# Patient Record
Sex: MALE | Race: WHITE | NOT HISPANIC OR LATINO | Employment: OTHER | URBAN - METROPOLITAN AREA
[De-identification: names, ages, dates, MRNs, and addresses within clinical notes are randomized per-mention and may not be internally consistent; named-entity substitution may affect disease eponyms.]

---

## 2024-11-11 ENCOUNTER — ANESTHESIA EVENT (OUTPATIENT)
Facility: HOSPITAL | Age: 69
End: 2024-11-11
Payer: MEDICARE

## 2024-11-11 NOTE — ANESTHESIA PREPROCEDURE EVALUATION
"                                                                                                             11/11/2024  Dennis Vega is a 69 y.o., male.  Pre-operative evaluation for Procedure(s) (LRB):  PHACOEMULSIFICATION, CATARACT, WITH IOL INSERTION   ////left eye; Diamox (Left)    Dennis Vega is a 69 y.o. male     There is no problem list on file for this patient.      Review of patient's allergies indicates:   Allergen Reactions    Penicillins      Unknown       No current facility-administered medications on file prior to encounter.     No current outpatient medications on file prior to encounter.       History reviewed. No pertinent surgical history.    CBC: No results for input(s): "WBC", "RBC", "HGB", "HCT", "PLT", "MCV", "MCH", "MCHC" in the last 72 hours.    CMP: No results for input(s): "NA", "K", "CL", "CO2", "BUN", "CREATININE", "GLU", "MG", "PHOS", "CALCIUM", "ALBUMIN", "PROT", "ALKPHOS", "ALT", "AST", "BILITOT" in the last 72 hours.    INR  No results for input(s): "PT", "INR", "PROTIME", "APTT" in the last 72 hours.        Diagnostic Studies:  CXR :    EKG :      2D Echo :  No results found for this or any previous visit.    Pre-op Assessment    I have reviewed the Patient Summary Reports.     I have reviewed the Nursing Notes. I have reviewed the NPO Status.   I have reviewed the Medications.     Review of Systems  Anesthesia Hx:  No previous Anesthesia   Neg history of prior surgery.          Denies Family Hx of Anesthesia complications.     Social:  Non-Smoker, No Alcohol Use       Hematology/Oncology:  Hematology Normal                                     EENT/Dental:  EENT/Dental Normal           Cardiovascular:  Cardiovascular Normal    Denies Pacemaker.     Denies CABG/stent.             Patient not on beta blockers                          Pulmonary:  Pulmonary Normal                       Renal/:  Renal/ Normal                 Hepatic/GI:  Hepatic/GI Normal     Denies GERD.    Not " Taking GLP-1 Agonists            Musculoskeletal:  Musculoskeletal Normal                Neurological:  Neurology Normal                                      Endocrine:  Endocrine Normal            Dermatological:  Skin Normal    Psych:  Psychiatric Normal                    Physical Exam  General: Well nourished, Cooperative, Alert and Oriented    Airway:  Mallampati: I / I  Mouth Opening: Normal  TM Distance: Normal  Tongue: Normal  Neck ROM: Normal ROM    Dental:  Intact  Px denies any loose teeth. Crowns few molars  Chest/Lungs:  Clear to auscultation, Normal Respiratory Rate    Heart:  Rate: Normal  Rhythm: Regular Rhythm    Abdomen:  Normal, Soft        Anesthesia Plan  Type of Anesthesia, risks & benefits discussed:    Anesthesia Type: MAC  Intra-op Monitoring Plan: Standard ASA Monitors  Induction:  IV  Informed Consent: Informed consent signed with the Patient and all parties understand the risks and agree with anesthesia plan.  All questions answered.   ASA Score: 2  Day of Surgery Review of History & Physical: H&P Update referred to the surgeon/provider.I have interviewed and examined the patient. I have reviewed the patient's H&P dated:   Anesthesia Plan Notes: TIVA with mask/NC, GA back-up.     Ready For Surgery From Anesthesia Perspective.     .

## 2024-11-12 ENCOUNTER — HOSPITAL ENCOUNTER (OUTPATIENT)
Facility: HOSPITAL | Age: 69
Discharge: HOME | End: 2024-11-12
Attending: OPHTHALMOLOGY | Admitting: OPHTHALMOLOGY
Payer: MEDICARE

## 2024-11-12 ENCOUNTER — ANESTHESIA (OUTPATIENT)
Facility: HOSPITAL | Age: 69
End: 2024-11-12
Payer: MEDICARE

## 2024-11-12 VITALS
BODY MASS INDEX: 24.75 KG/M2 | HEIGHT: 64 IN | SYSTOLIC BLOOD PRESSURE: 126 MMHG | HEART RATE: 62 BPM | WEIGHT: 145 LBS | TEMPERATURE: 98 F | DIASTOLIC BLOOD PRESSURE: 72 MMHG | OXYGEN SATURATION: 95 %

## 2024-11-12 DIAGNOSIS — H26.9 CATARACT: ICD-10-CM

## 2024-11-12 PROCEDURE — 37000009 HC ANESTHESIA EA ADD 15 MINS: Performed by: OPHTHALMOLOGY

## 2024-11-12 PROCEDURE — 37000008 HC ANESTHESIA 1ST 15 MINUTES: Performed by: OPHTHALMOLOGY

## 2024-11-12 PROCEDURE — 63600175 PHARM REV CODE 636 W HCPCS: Performed by: OPHTHALMOLOGY

## 2024-11-12 PROCEDURE — 63600175 PHARM REV CODE 636 W HCPCS

## 2024-11-12 PROCEDURE — 25000003 PHARM REV CODE 250

## 2024-11-12 PROCEDURE — 36000707: Performed by: OPHTHALMOLOGY

## 2024-11-12 PROCEDURE — 71000015 HC POSTOP RECOV 1ST HR: Performed by: OPHTHALMOLOGY

## 2024-11-12 PROCEDURE — 36000706: Performed by: OPHTHALMOLOGY

## 2024-11-12 PROCEDURE — V2632 POST CHMBR INTRAOCULAR LENS: HCPCS | Performed by: OPHTHALMOLOGY

## 2024-11-12 PROCEDURE — 27201423 OPTIME MED/SURG SUP & DEVICES STERILE SUPPLY: Performed by: OPHTHALMOLOGY

## 2024-11-12 PROCEDURE — 25000003 PHARM REV CODE 250: Performed by: OPHTHALMOLOGY

## 2024-11-12 DEVICE — TECNIS SIMPLICITY TECNIS EYHANCE U 23.5D
Type: IMPLANTABLE DEVICE | Site: EYE | Status: FUNCTIONAL
Brand: TECNIS SIMPLICITY

## 2024-11-12 RX ORDER — ACETAMINOPHEN 500 MG
1000 TABLET ORAL ONCE
Status: COMPLETED | OUTPATIENT
Start: 2024-11-12 | End: 2024-11-12

## 2024-11-12 RX ORDER — DEXAMETHASONE SODIUM PHOSPHATE 10 MG/ML
INJECTION INTRAMUSCULAR; INTRAVENOUS
Status: DISCONTINUED | OUTPATIENT
Start: 2024-11-12 | End: 2024-11-12 | Stop reason: HOSPADM

## 2024-11-12 RX ORDER — ONDANSETRON HYDROCHLORIDE 2 MG/ML
INJECTION, SOLUTION INTRAVENOUS
Status: DISCONTINUED | OUTPATIENT
Start: 2024-11-12 | End: 2024-11-12

## 2024-11-12 RX ORDER — POVIDONE-IODINE 5 %
SOLUTION, NON-ORAL OPHTHALMIC (EYE)
Status: DISCONTINUED | OUTPATIENT
Start: 2024-11-12 | End: 2024-11-12 | Stop reason: HOSPADM

## 2024-11-12 RX ORDER — MIDAZOLAM HYDROCHLORIDE 1 MG/ML
INJECTION INTRAMUSCULAR; INTRAVENOUS
Status: DISCONTINUED | OUTPATIENT
Start: 2024-11-12 | End: 2024-11-12

## 2024-11-12 RX ORDER — GENTAMICIN 40 MG/ML
INJECTION, SOLUTION INTRAMUSCULAR; INTRAVENOUS
Status: DISCONTINUED | OUTPATIENT
Start: 2024-11-12 | End: 2024-11-12 | Stop reason: HOSPADM

## 2024-11-12 RX ORDER — TROPICAMIDE 10 MG/ML
1 SOLUTION/ DROPS OPHTHALMIC
Status: COMPLETED | OUTPATIENT
Start: 2024-11-12 | End: 2024-11-12

## 2024-11-12 RX ORDER — ACETAZOLAMIDE 250 MG/1
500 TABLET ORAL ONCE
Status: COMPLETED | OUTPATIENT
Start: 2024-11-12 | End: 2024-11-12

## 2024-11-12 RX ORDER — BUPIVACAINE HYDROCHLORIDE 7.5 MG/ML
INJECTION, SOLUTION EPIDURAL; RETROBULBAR
Status: DISCONTINUED | OUTPATIENT
Start: 2024-11-12 | End: 2024-11-12 | Stop reason: HOSPADM

## 2024-11-12 RX ORDER — FENTANYL CITRATE 50 UG/ML
INJECTION, SOLUTION INTRAMUSCULAR; INTRAVENOUS
Status: DISCONTINUED | OUTPATIENT
Start: 2024-11-12 | End: 2024-11-12

## 2024-11-12 RX ORDER — EPINEPHRINE 1 MG/ML
INJECTION INTRAMUSCULAR; INTRAVENOUS; SUBCUTANEOUS
Status: DISCONTINUED | OUTPATIENT
Start: 2024-11-12 | End: 2024-11-12 | Stop reason: HOSPADM

## 2024-11-12 RX ORDER — CYCLOPENTOLATE HYDROCHLORIDE 10 MG/ML
1 SOLUTION/ DROPS OPHTHALMIC
Status: COMPLETED | OUTPATIENT
Start: 2024-11-12 | End: 2024-11-12

## 2024-11-12 RX ORDER — LIDOCAINE HYDROCHLORIDE 10 MG/ML
INJECTION, SOLUTION EPIDURAL; INFILTRATION; INTRACAUDAL; PERINEURAL
Status: DISCONTINUED | OUTPATIENT
Start: 2024-11-12 | End: 2024-11-12 | Stop reason: HOSPADM

## 2024-11-12 RX ORDER — PHENYLEPHRINE HYDROCHLORIDE 100 MG/ML
1 SOLUTION/ DROPS OPHTHALMIC
Status: COMPLETED | OUTPATIENT
Start: 2024-11-12 | End: 2024-11-12

## 2024-11-12 RX ADMIN — CYCLOPENTOLATE HYDROCHLORIDE 1 DROP: 10 SOLUTION/ DROPS OPHTHALMIC at 06:11

## 2024-11-12 RX ADMIN — TROPICAMIDE 1 DROP: 10 SOLUTION/ DROPS OPHTHALMIC at 06:11

## 2024-11-12 RX ADMIN — PHENYLEPHRINE HYDROCHLORIDE 1 DROP: 100 SOLUTION/ DROPS OPHTHALMIC at 06:11

## 2024-11-12 RX ADMIN — MIDAZOLAM 2 MG: 1 INJECTION INTRAMUSCULAR; INTRAVENOUS at 07:11

## 2024-11-12 RX ADMIN — ACETAZOLAMIDE 500 MG: 250 TABLET ORAL at 06:11

## 2024-11-12 RX ADMIN — ONDANSETRON HYDROCHLORIDE 4 MG: 2 SOLUTION INTRAMUSCULAR; INTRAVENOUS at 07:11

## 2024-11-12 RX ADMIN — ACETAMINOPHEN 1000 MG: 500 TABLET ORAL at 06:11

## 2024-11-12 RX ADMIN — FENTANYL CITRATE 50 MCG: 50 INJECTION INTRAMUSCULAR; INTRAVENOUS at 07:11

## 2024-11-12 NOTE — OP NOTE
DATE OF SURGERY:  11/12/2024     SURGEON:  Joanna Venegas MD    PREOPERATIVE DIAGNOSIS:  Nuclear sclerotic cataract, left eye.    POSTOPERATIVE DIAGNOSIS:  Same, status post procedure.    PROCEDURE:  Cataract extraction with intraocular lens implant of the left eye.     IOL:23.5 diboo    Anesthesia:  Local plus MAC.   Complications:  None.   Estimated blood loss:  0    PROCEDURE IN DETAIL:  After discussing risks, benefits and alternatives with the patient and family, informed consent was obtained from the patient in clinic.  The patient was brought to the operating room and placed in supine position.  MAC anesthesia was undertaken without complications.  The operative eye and periorbital region were prepped and draped in the usual manner for intraocular surgery.  A wire speculum was placed in the operative eye for adequate exposure.  A paracentesis was made at 4 o'clock through clear cornea at the limbus.  Viscoelastic was injected into the anterior chamber.  A triplanar cataract incision was made at 2 o'clock through clear cornea at the limbus.  A curvilinear capsulorrhexis was created with a cystitome and finished with a Utrata forceps.  BSS in the cannula was used to hydrodissect the lens, and the lens was found to move freely within the capsular bag.  The lens was then removed in divide and conquer technique.  The remaining cortical material was removed with the irrigation aspiration handpiece.  A lens was then inserted into the capsular bag an remained in good position.  The remaining viscoelastic was then removed with the irrigation aspiration handpiece.  The wound was hydrated and found to be watertight. Postop injections were given. The wire speculum was then removed, and the patient was cleaned in wet to dry fashion, followed by a shield.  The patient tolerated the procedure well and left the operating room in stable condition.

## 2024-11-12 NOTE — ANESTHESIA POSTPROCEDURE EVALUATION
Anesthesia Post Evaluation    Patient: Dennis Vega    Procedure(s) Performed: Procedure(s) (LRB):  PHACOEMULSIFICATION, CATARACT, WITH IOL INSERTION   ////left eye; Diamox (Left)    Final Anesthesia Type: MAC      Patient location during evaluation: OPS  Patient participation: Yes- Able to Participate  Level of consciousness: awake and alert, awake and oriented  Post-procedure vital signs: reviewed and stable  Pain management: adequate  Airway patency: patent    PONV status at discharge: No PONV  Anesthetic complications: no      Cardiovascular status: blood pressure returned to baseline, hemodynamically stable and stable  Respiratory status: unassisted, spontaneous ventilation and room air  Hydration status: euvolemic  Follow-up not needed.              Vitals Value Taken Time   /72 11/12/24 0836   Temp ** 11/12/24 0927   Pulse 62 11/12/24 0838   Resp ** 11/12/24 0927   SpO2 95 % 11/12/24 0838         No case tracking events are documented in the log.      Pain/Yoselin Score: Pain Rating Prior to Med Admin: 0 (11/12/2024  6:24 AM)  Yoselin Score: 10 (11/12/2024  8:05 AM)

## 2024-11-12 NOTE — ANESTHESIA POSTPROCEDURE EVALUATION
Anesthesia Post Evaluation    Patient: Dennis Vega    Procedure(s) Performed: Procedure(s) (LRB):  PHACOEMULSIFICATION, CATARACT, WITH IOL INSERTION   ////left eye; Diamox (Left)    Final Anesthesia Type: MAC      Patient location during evaluation: OPS  Patient participation: Yes- Able to Participate  Level of consciousness: awake and alert  Post-procedure vital signs: reviewed and stable  Pain management: adequate  Airway patency: patent    PONV status at discharge: No PONV  Anesthetic complications: no      Cardiovascular status: blood pressure returned to baseline  Respiratory status: unassisted, room air and spontaneous ventilation  Hydration status: euvolemic  Follow-up not needed.              Vitals Value Taken Time   /72 11/12/24 0620   Temp 36.7 °C (98 °F) 11/12/24 0620   Pulse 67 11/12/24 0620   Resp 14 11/12/24 0749   SpO2 98 % 11/12/24 0620         No case tracking events are documented in the log.      Pain/Yoselin Score: Pain Rating Prior to Med Admin: 0 (11/12/2024  6:24 AM)

## 2024-11-12 NOTE — DISCHARGE INSTRUCTIONS
Theo Eye Surgery, Care After    Refer to this sheet in the next few weeks. These instructions provide you with information on caring for yourself after your procedure. Your health care provider may also give you more specific instructions. Your treatment has been planned according to current medical practices, but problems sometimes occur. Call your health care provider if you have any problems or questions after your procedure.    WHAT TO EXPECT AFTER THE PROCEDURE  After your procedure, it is typical to have the following:  Changes in depth perception.  Blurry vision.  Eye discomfort.    HOME CARE INSTRUCTIONS  Keep all follow-up visits as directed by your health care provider. This is important.  You may receive medicine to help with pain or discomfort. Take medicines only as directed by your health care provider.  Leave patch on overnight, keep it clean and dry. Dr Venegas will remove it a your follow up appointment.   Avoid sleeping on your stomach or operative side. Sleep on your back with your head elevated on 2-3 pillows.  No strenuous activity, heavy lifting, bending over, or straining of any kind.  No eye drops overnight into surgery eye. Bring all eye drops to your follow up appointment.  Resume all other meds taken at home.  Continue eye drops in the non-surgical eye.    Meds:  Tylenol 650mg by mouth every four hours as needed for pain.  Next dose at __12:30 pm_______ (mild pain)  You may take ibuprofen or your prescribed, Toradol every 4-6 hours as needed for pain next dose at __anytime________ (moderate pain)  You may take Phenergan 12.5mg 1 every 4-5 hours as needed for nausea next dose at any time. May cause drowsiness.     Avoid:  Rubbing your eyes.  Smoking.  Contact sports, strenuous yard work, housework, and swimming.  Lifting heavy objects and bending for 1-2 weeks.    SEEK MEDICAL CARE IF:  You notice signs of infection in your eye.  You develop increased pain, inflammation, or  swelling.  You notice visual changes or a pus-like drainage.    SEEK IMMEDIATE MEDICAL CARE IF:  You lose all vision.

## 2024-11-25 ENCOUNTER — ANESTHESIA EVENT (OUTPATIENT)
Facility: HOSPITAL | Age: 69
End: 2024-11-25
Payer: MEDICARE

## 2024-11-26 ENCOUNTER — HOSPITAL ENCOUNTER (OUTPATIENT)
Facility: HOSPITAL | Age: 69
Discharge: HOME | End: 2024-11-26
Attending: OPHTHALMOLOGY | Admitting: OPHTHALMOLOGY
Payer: MEDICARE

## 2024-11-26 ENCOUNTER — ANESTHESIA (OUTPATIENT)
Facility: HOSPITAL | Age: 69
End: 2024-11-26
Payer: MEDICARE

## 2024-11-26 VITALS
SYSTOLIC BLOOD PRESSURE: 131 MMHG | HEIGHT: 64 IN | TEMPERATURE: 98 F | OXYGEN SATURATION: 99 % | HEART RATE: 64 BPM | BODY MASS INDEX: 24.75 KG/M2 | WEIGHT: 145 LBS | DIASTOLIC BLOOD PRESSURE: 81 MMHG

## 2024-11-26 DIAGNOSIS — H26.9 CATARACT: ICD-10-CM

## 2024-11-26 PROCEDURE — 63600175 PHARM REV CODE 636 W HCPCS: Performed by: OPHTHALMOLOGY

## 2024-11-26 PROCEDURE — 36000706: Performed by: OPHTHALMOLOGY

## 2024-11-26 PROCEDURE — 25000003 PHARM REV CODE 250: Performed by: OPHTHALMOLOGY

## 2024-11-26 PROCEDURE — 25000003 PHARM REV CODE 250

## 2024-11-26 PROCEDURE — 37000009 HC ANESTHESIA EA ADD 15 MINS: Performed by: OPHTHALMOLOGY

## 2024-11-26 PROCEDURE — V2788 PRESBYOPIA-CORRECT FUNCTION: HCPCS | Performed by: OPHTHALMOLOGY

## 2024-11-26 PROCEDURE — 27201423 OPTIME MED/SURG SUP & DEVICES STERILE SUPPLY: Performed by: OPHTHALMOLOGY

## 2024-11-26 PROCEDURE — 63600175 PHARM REV CODE 636 W HCPCS: Performed by: NURSE ANESTHETIST, CERTIFIED REGISTERED

## 2024-11-26 PROCEDURE — 37000008 HC ANESTHESIA 1ST 15 MINUTES: Performed by: OPHTHALMOLOGY

## 2024-11-26 PROCEDURE — 36000707: Performed by: OPHTHALMOLOGY

## 2024-11-26 PROCEDURE — 71000015 HC POSTOP RECOV 1ST HR: Performed by: OPHTHALMOLOGY

## 2024-11-26 DEVICE — TECNIS ODYSSEY SIMPLICITY 23.5D
Type: IMPLANTABLE DEVICE | Site: EYE | Status: FUNCTIONAL
Brand: TECNIS ODYSSEY IOL

## 2024-11-26 RX ORDER — PHENYLEPHRINE HYDROCHLORIDE 100 MG/ML
1 SOLUTION/ DROPS OPHTHALMIC
Status: COMPLETED | OUTPATIENT
Start: 2024-11-26 | End: 2024-11-26

## 2024-11-26 RX ORDER — POVIDONE-IODINE 5 %
SOLUTION, NON-ORAL OPHTHALMIC (EYE)
Status: DISCONTINUED | OUTPATIENT
Start: 2024-11-26 | End: 2024-11-26 | Stop reason: HOSPADM

## 2024-11-26 RX ORDER — BUPIVACAINE HYDROCHLORIDE 7.5 MG/ML
INJECTION, SOLUTION EPIDURAL; RETROBULBAR
Status: DISCONTINUED | OUTPATIENT
Start: 2024-11-26 | End: 2024-11-26 | Stop reason: HOSPADM

## 2024-11-26 RX ORDER — LIDOCAINE HYDROCHLORIDE 10 MG/ML
1 INJECTION, SOLUTION EPIDURAL; INFILTRATION; INTRACAUDAL; PERINEURAL ONCE
Status: DISCONTINUED | OUTPATIENT
Start: 2024-11-26 | End: 2024-11-26 | Stop reason: HOSPADM

## 2024-11-26 RX ORDER — GENTAMICIN 40 MG/ML
INJECTION, SOLUTION INTRAMUSCULAR; INTRAVENOUS
Status: DISCONTINUED | OUTPATIENT
Start: 2024-11-26 | End: 2024-11-26 | Stop reason: HOSPADM

## 2024-11-26 RX ORDER — ACETAMINOPHEN 500 MG
1000 TABLET ORAL ONCE
Status: COMPLETED | OUTPATIENT
Start: 2024-11-26 | End: 2024-11-26

## 2024-11-26 RX ORDER — DEXAMETHASONE SODIUM PHOSPHATE 10 MG/ML
INJECTION INTRAMUSCULAR; INTRAVENOUS
Status: DISCONTINUED | OUTPATIENT
Start: 2024-11-26 | End: 2024-11-26 | Stop reason: HOSPADM

## 2024-11-26 RX ORDER — BUPIVACAINE HYDROCHLORIDE 7.5 MG/ML
INJECTION, SOLUTION EPIDURAL; RETROBULBAR
Status: DISCONTINUED
Start: 2024-11-26 | End: 2024-11-26 | Stop reason: HOSPADM

## 2024-11-26 RX ORDER — CEFAZOLIN SODIUM 1 G/3ML
INJECTION, POWDER, FOR SOLUTION INTRAMUSCULAR; INTRAVENOUS
Status: DISCONTINUED
Start: 2024-11-26 | End: 2024-11-26 | Stop reason: HOSPADM

## 2024-11-26 RX ORDER — POVIDONE-IODINE 5 %
SOLUTION, NON-ORAL OPHTHALMIC (EYE)
Status: DISCONTINUED
Start: 2024-11-26 | End: 2024-11-26 | Stop reason: HOSPADM

## 2024-11-26 RX ORDER — GENTAMICIN 40 MG/ML
INJECTION, SOLUTION INTRAMUSCULAR; INTRAVENOUS
Status: DISCONTINUED
Start: 2024-11-26 | End: 2024-11-26 | Stop reason: HOSPADM

## 2024-11-26 RX ORDER — NEOMYCIN SULFATE, POLYMYXIN B SULFATE, AND DEXAMETHASONE 3.5; 10000; 1 MG/G; [USP'U]/G; MG/G
OINTMENT OPHTHALMIC
Status: DISCONTINUED
Start: 2024-11-26 | End: 2024-11-26 | Stop reason: HOSPADM

## 2024-11-26 RX ORDER — SODIUM CHLORIDE, SODIUM GLUCONATE, SODIUM ACETATE, POTASSIUM CHLORIDE AND MAGNESIUM CHLORIDE 30; 37; 368; 526; 502 MG/100ML; MG/100ML; MG/100ML; MG/100ML; MG/100ML
INJECTION, SOLUTION INTRAVENOUS CONTINUOUS
Status: DISCONTINUED | OUTPATIENT
Start: 2024-11-26 | End: 2024-11-26 | Stop reason: HOSPADM

## 2024-11-26 RX ORDER — MIDAZOLAM HYDROCHLORIDE 1 MG/ML
INJECTION INTRAMUSCULAR; INTRAVENOUS
Status: DISCONTINUED | OUTPATIENT
Start: 2024-11-26 | End: 2024-11-26

## 2024-11-26 RX ORDER — EPINEPHRINE 1 MG/ML
INJECTION, SOLUTION, CONCENTRATE INTRAVENOUS
Status: DISCONTINUED
Start: 2024-11-26 | End: 2024-11-26 | Stop reason: HOSPADM

## 2024-11-26 RX ORDER — DEXAMETHASONE SODIUM PHOSPHATE 10 MG/ML
INJECTION INTRAMUSCULAR; INTRAVENOUS
Status: DISCONTINUED
Start: 2024-11-26 | End: 2024-11-26 | Stop reason: HOSPADM

## 2024-11-26 RX ORDER — EPINEPHRINE 1 MG/ML
INJECTION INTRAMUSCULAR; INTRAVENOUS; SUBCUTANEOUS
Status: DISCONTINUED | OUTPATIENT
Start: 2024-11-26 | End: 2024-11-26 | Stop reason: HOSPADM

## 2024-11-26 RX ORDER — FENTANYL CITRATE 50 UG/ML
INJECTION, SOLUTION INTRAMUSCULAR; INTRAVENOUS
Status: DISCONTINUED | OUTPATIENT
Start: 2024-11-26 | End: 2024-11-26

## 2024-11-26 RX ORDER — ACETAZOLAMIDE 250 MG/1
500 TABLET ORAL ONCE
Status: COMPLETED | OUTPATIENT
Start: 2024-11-26 | End: 2024-11-26

## 2024-11-26 RX ORDER — TRIAMCINOLONE ACETONIDE 40 MG/ML
INJECTION, SUSPENSION INTRA-ARTICULAR; INTRAMUSCULAR
Status: DISCONTINUED
Start: 2024-11-26 | End: 2024-11-26 | Stop reason: HOSPADM

## 2024-11-26 RX ORDER — LIDOCAINE HYDROCHLORIDE 10 MG/ML
INJECTION, SOLUTION EPIDURAL; INFILTRATION; INTRACAUDAL; PERINEURAL
Status: DISCONTINUED | OUTPATIENT
Start: 2024-11-26 | End: 2024-11-26 | Stop reason: HOSPADM

## 2024-11-26 RX ORDER — TROPICAMIDE 10 MG/ML
1 SOLUTION/ DROPS OPHTHALMIC
Status: COMPLETED | OUTPATIENT
Start: 2024-11-26 | End: 2024-11-26

## 2024-11-26 RX ORDER — LIDOCAINE HYDROCHLORIDE 10 MG/ML
INJECTION, SOLUTION EPIDURAL; INFILTRATION; INTRACAUDAL; PERINEURAL
Status: DISCONTINUED
Start: 2024-11-26 | End: 2024-11-26 | Stop reason: HOSPADM

## 2024-11-26 RX ORDER — CYCLOPENTOLATE HYDROCHLORIDE 10 MG/ML
1 SOLUTION/ DROPS OPHTHALMIC
Status: COMPLETED | OUTPATIENT
Start: 2024-11-26 | End: 2024-11-26

## 2024-11-26 RX ORDER — ONDANSETRON HYDROCHLORIDE 2 MG/ML
INJECTION, SOLUTION INTRAVENOUS
Status: DISCONTINUED | OUTPATIENT
Start: 2024-11-26 | End: 2024-11-26

## 2024-11-26 RX ADMIN — ACETAMINOPHEN 1000 MG: 500 TABLET ORAL at 06:11

## 2024-11-26 RX ADMIN — CYCLOPENTOLATE HYDROCHLORIDE 1 DROP: 10 SOLUTION/ DROPS OPHTHALMIC at 06:11

## 2024-11-26 RX ADMIN — PHENYLEPHRINE HYDROCHLORIDE 1 DROP: 100 SOLUTION/ DROPS OPHTHALMIC at 06:11

## 2024-11-26 RX ADMIN — TROPICAMIDE 1 DROP: 10 SOLUTION/ DROPS OPHTHALMIC at 06:11

## 2024-11-26 RX ADMIN — ACETAZOLAMIDE 500 MG: 250 TABLET ORAL at 06:11

## 2024-11-26 RX ADMIN — MIDAZOLAM 2 MG: 1 INJECTION INTRAMUSCULAR; INTRAVENOUS at 07:11

## 2024-11-26 RX ADMIN — FENTANYL CITRATE 50 MCG: 50 INJECTION INTRAMUSCULAR; INTRAVENOUS at 07:11

## 2024-11-26 RX ADMIN — ONDANSETRON HYDROCHLORIDE 4 MG: 2 SOLUTION INTRAMUSCULAR; INTRAVENOUS at 07:11

## 2024-11-26 NOTE — ANESTHESIA POSTPROCEDURE EVALUATION
Anesthesia Post Evaluation    Patient: Dennis Vega    Procedure(s) Performed: Procedure(s) (LRB):  PHACOEMULSIFICATION, CATARACT, WITH IOL INSERTION   ////right eye; Odyssey; Catalys; Diamox (Right)    Final Anesthesia Type: MAC      Patient location during evaluation: OPS  Patient participation: Yes- Able to Participate  Level of consciousness: awake and alert  Post-procedure vital signs: reviewed and stable  Pain management: adequate  Airway patency: patent    PONV status at discharge: No PONV  Anesthetic complications: no      Cardiovascular status: blood pressure returned to baseline  Respiratory status: unassisted, room air and spontaneous ventilation  Hydration status: euvolemic  Follow-up not needed.              Vitals Value Taken Time   /88 11/26/24 0604   Temp 36.6 °C (97.9 °F) 11/26/24 0604   Pulse 84 11/26/24 0604   Resp 16 11/26/24 0806   SpO2 99 % 11/26/24 0604         No case tracking events are documented in the log.      Pain/Yoselin Score: Pain Rating Prior to Med Admin: 0 (11/26/2024  6:15 AM)          
78

## 2024-11-26 NOTE — ANESTHESIA PREPROCEDURE EVALUATION
"                                                                                                             11/25/2024  Dennis Vega is a 69 y.o., male.  Diagnosis:   Nuclear sclerotic cataract of right eye [H25.11]   Pre-op diagnosis: Nuclear sclerotic cataract of right eye [H25.11]   [address]       The pt. comes to  [facility] for the noted procedure under IV Sedation and  +/- Topical Ophthalmic gtts.  Case: [id number] Date/Time: 11/26/24 0735   Procedure:   PHACOEMULSIFICATION, CATARACT, WITH IOL INSERTION   right eye; Odyssey; Catalys; Diamox (Right)   Anesthesia type: Monitor Anesthesia Care     PMHx:  Other Medical History   Catarac            PSHx:  Surgical History:  DENTAL SURGERY PHACOEMULSIFICATION, CATARACT, WITH IOL INSERTION           Vital signs:  eight: 5' 4" (1.626 m) (11/22/24) Weight: 65.8 kg (145 lb) (11/22/24)   BMI: 24.9 IBW: 59.2 kg (130 lb 9.5 oz)      Last Values     Most Recent Value 11/26/2024  0604 11/22/2024  0814 11/12/2024  0838   Height: 5' 4" (162.6 cm)  as of 11/22/2024 -- 5' 4" (162.6 cm) --   Last Wt: 65.8 kg (145 lb)  as of 11/22/2024 -- 65.8 kg (145 lb) --   Body Mass Index: 24.89 kg/m²  5' 4" (162.6 cm)  as of 11/22/2024  65.8 kg (145 lb)  as of 11/22/2024      Pulse: 84  as of 11/26/2024 84 -- 62   BP: 194/91 Abnormal   as of 11/26/2024 194/91 Abnormal  -- --   Temp: 36.6 °C (97.9 °F)  as of 11/26/2024 36.6 °C (97.9 °F) -- --   SpO2: 99%  as of 11/26/2024 99 % -- 95 %   Dosing Weight: None              Lab Data:  N/A    EKG:  N/A    Pre-op Assessment          Review of Systems      Pre-op Assessment    I have reviewed the Patient Summary Reports.     I have reviewed the Nursing Notes. I have reviewed the NPO Status.   I have reviewed the Medications.     Review of Systems  Anesthesia Hx:  No problems with previous Anesthesia                Social:  Non-Smoker       Hematology/Oncology:  Hematology Normal   Oncology Normal                            "        EENT/Dental:  EENT/Dental Normal           Cardiovascular:  Cardiovascular Normal Exercise tolerance: good                     Functional Capacity good / => 4 METS                         Pulmonary:  Pulmonary Normal                       Renal/:  Renal/ Normal                 Hepatic/GI:  Hepatic/GI Normal                    Musculoskeletal:  Musculoskeletal Normal                Neurological:  Neurology Normal                                      Endocrine:  Endocrine Normal            Dermatological:  Skin Normal    Psych:  Psychiatric Normal                    Physical Exam  General: Alert, Oriented, Well nourished and Cooperative    Airway:  Mallampati: II   Mouth Opening: Normal  TM Distance: Normal  Tongue: Normal  Neck ROM: Normal ROM    Dental:  Intact    Chest/Lungs:  Clear to auscultation, Normal Respiratory Rate    Heart:  Rate: Normal  Rhythm: Regular Rhythm        Anesthesia Plan  Type of Anesthesia, risks & benefits discussed:    Anesthesia Type: Gen Natural Airway, MAC  Intra-op Monitoring Plan: Standard ASA Monitors  Post Op Pain Control Plan: IV/PO Opioids PRN  Induction:  IV  ASA Score: 2  Day of Surgery Review of History & Physical: H&P Update referred to the surgeon/provider.  Anesthesia Plan Notes: IV Sedation with Topical Ophthalmic gtts    Ready For Surgery From Anesthesia Perspective.     .

## 2024-11-26 NOTE — DISCHARGE INSTRUCTIONS
Theo Eye Surgery, Care After    Refer to this sheet in the next few weeks. These instructions provide you with information on caring for yourself after your procedure. Your health care provider may also give you more specific instructions. Your treatment has been planned according to current medical practices, but problems sometimes occur. Call your health care provider if you have any problems or questions after your procedure.    WHAT TO EXPECT AFTER THE PROCEDURE  After your procedure, it is typical to have the following:  Changes in depth perception.  Blurry vision.  Eye discomfort.    HOME CARE INSTRUCTIONS  Keep all follow-up visits as directed by your health care provider. This is important.  You may receive medicine to help with pain or discomfort. Take medicines only as directed by your health care provider.  Leave patch on overnight, keep it clean and dry. Dr Venegas will remove it a your follow up appointment.   Avoid sleeping on your stomach or operative side. Sleep on your back with your head elevated on 2-3 pillows.  No strenuous activity, heavy lifting, bending over, or straining of any kind.  No eye drops overnight into surgery eye. Bring all eye drops to your follow up appointment.  Resume all other meds taken at home.  Continue eye drops in the non-surgical eye.    Meds:  Tylenol 650mg by mouth every four hours as needed for pain.  Next dose at __12:00 pm_______ (mild pain)  You may take ibuprofen or your prescribed, Toradol every 4-6 hours as needed for pain next dose at ___any time_______ (moderate pain)  You may take Phenergan 12.5mg 1 every 4-5 hours as needed for nausea next dose at any time. May cause drowsiness.     Avoid:  Rubbing your eyes.  Smoking.  Contact sports, strenuous yard work, housework, and swimming.  Lifting heavy objects and bending for 1-2 weeks.    SEEK MEDICAL CARE IF:  You notice signs of infection in your eye.  You develop increased pain, inflammation, or  swelling.  You notice visual changes or a pus-like drainage.    SEEK IMMEDIATE MEDICAL CARE IF:  You lose all vision.

## 2024-11-26 NOTE — OP NOTE
DATE OF SURGERY:  11/26/2024     SURGEON:  Joanna Venegas MD    PREOPERATIVE DIAGNOSIS:  Nucleosclerotic cataract of the right eye.    POST OPERATIVE DIAGNOSIS:  Nucleosclerotic cataract of the right eye.    PROCEDURE:  Cataract extraction with intraocular lens implantation of the right eye.    ANESTHESIA:  Local plus MAC.    COMPLICATIONS:  None.    ESTIMATED BLOOD LOSS:  None.    IMPLANTS: 23.5 drnoov    After discussion of risks, benefits and alternative with the patient and family, informed consent was obtained by the patient in clinic including but not limited to bleeding, infection, decreased vision, loss of the eye, need for subsequent surgery.  The patient understands and wishes to proceed.    PROCEDURE IN DETAIL:    The patient was brought into the operating room and laid in supine manner.  After anesthesia was undertaken without complication, the right eye and periorbital region were prepped and draped in usual manner for intraocular surgery while a speculum was placed in the operative eye for adequate exposure. A paracentesis incision was made at approximately 11 o'clock with a clear cornea at the limbus. Preservative free Lidocaine and epinephrine was injected into the anterior chamber followed by viscoelastic.  A triplanar cataract wound was then created approximately 7 o'clock through clear cornea at the limbus.  Curvilinear capsulorrhexis was created without complication.     BSS sonic cannula was used to hydrodissect the lens.  The lens was found to move freely within the capsular bag.  Lens was then removed in divide and conquer technique.  Remaining cortical material was removed with I&A handpiece.  The lens was then implanted into the capsular bag. The remaining viscoelastic was then removed with the irrigation aspiration handpiece. The wounds were hydrated and postop injections given  The patient tolerated the procedure well.  Eyelid speculum was removed followed by pressure patch and  shield.  The patient was turned over to anesthesia in stable condition.

## 2024-11-26 NOTE — TRANSFER OF CARE
"Anesthesia Transfer of Care Note    Patient: Dennis Vega    Procedure(s) Performed: Procedure(s) (LRB):  PHACOEMULSIFICATION, CATARACT, WITH IOL INSERTION   ////right eye; Odyssey; Catalys; Diamox (Right)    Patient location: PACU    Anesthesia Type: MAC    Transport from OR: Transported from OR on room air with adequate spontaneous ventilation    Post pain: adequate analgesia    Post assessment: no apparent anesthetic complications    Post vital signs: stable    Level of consciousness: awake    Nausea/Vomiting: no nausea/vomiting    Complications: none    Transfer of care protocol was followed      Last vitals: Visit Vitals  BP (!) 194/91   Pulse 84   Temp 36.6 °C (97.9 °F) (Oral)   Ht 5' 4" (1.626 m)   Wt 65.8 kg (145 lb)   SpO2 99%   BMI 24.89 kg/m²     "

## (undated) DEVICE — TIP I & A

## (undated) DEVICE — SYR 3ML LL 18GA 1.5IN

## (undated) DEVICE — Device

## (undated) DEVICE — ADAPTER DUAL NSL LUER M-M 7FT

## (undated) DEVICE — TIP PHACO 30 DEG BEVEL 20GA

## (undated) DEVICE — SYR W NDL BLN FILL 5ML 18GX1.5

## (undated) DEVICE — PACK CASSETTE TUBE ELLIPS FX

## (undated) DEVICE — GLOVE SIGNATURE MICRO LTX 6

## (undated) DEVICE — DRESSING TRANS 2X2 TEGADERM

## (undated) DEVICE — PACK EYE FOREMAN DISPOSABLE